# Patient Record
Sex: FEMALE | Race: WHITE | NOT HISPANIC OR LATINO | Employment: UNEMPLOYED | URBAN - METROPOLITAN AREA
[De-identification: names, ages, dates, MRNs, and addresses within clinical notes are randomized per-mention and may not be internally consistent; named-entity substitution may affect disease eponyms.]

---

## 2018-08-22 ENCOUNTER — HOSPITAL ENCOUNTER (EMERGENCY)
Facility: HOSPITAL | Age: 83
Discharge: HOME | End: 2018-08-22
Attending: FAMILY MEDICINE
Payer: MEDICARE

## 2018-08-22 VITALS
HEART RATE: 61 BPM | TEMPERATURE: 100 F | WEIGHT: 150.88 LBS | RESPIRATION RATE: 18 BRPM | DIASTOLIC BLOOD PRESSURE: 59 MMHG | HEIGHT: 63 IN | SYSTOLIC BLOOD PRESSURE: 117 MMHG | OXYGEN SATURATION: 95 % | BODY MASS INDEX: 26.73 KG/M2

## 2018-08-22 DIAGNOSIS — R33.9 URINARY RETENTION: Primary | ICD-10-CM

## 2018-08-22 LAB
ALBUMIN SERPL BCP-MCNC: 3.4 G/DL
ALP SERPL-CCNC: 61 U/L
ALT SERPL W/O P-5'-P-CCNC: 12 U/L
ANION GAP SERPL CALC-SCNC: 8 MMOL/L
AST SERPL-CCNC: 30 U/L
BASOPHILS # BLD AUTO: 0.01 K/UL
BASOPHILS NFR BLD: 0.1 %
BILIRUB SERPL-MCNC: 1.4 MG/DL
BILIRUB UR QL STRIP: NEGATIVE
BUN SERPL-MCNC: 33 MG/DL
CALCIUM SERPL-MCNC: 8.7 MG/DL
CHLORIDE SERPL-SCNC: 100 MMOL/L
CLARITY UR: CLEAR
CO2 SERPL-SCNC: 25 MMOL/L
COLOR UR: YELLOW
CREAT SERPL-MCNC: 1.8 MG/DL
DIFFERENTIAL METHOD: ABNORMAL
EOSINOPHIL # BLD AUTO: 0 K/UL
EOSINOPHIL NFR BLD: 0 %
ERYTHROCYTE [DISTWIDTH] IN BLOOD BY AUTOMATED COUNT: 13.9 %
EST. GFR  (AFRICAN AMERICAN): 29 ML/MIN/1.73 M^2
EST. GFR  (NON AFRICAN AMERICAN): 25 ML/MIN/1.73 M^2
GLUCOSE SERPL-MCNC: 159 MG/DL
GLUCOSE UR QL STRIP: NEGATIVE
HCT VFR BLD AUTO: 31 %
HGB BLD-MCNC: 10.2 G/DL
HGB UR QL STRIP: ABNORMAL
KETONES UR QL STRIP: NEGATIVE
LEUKOCYTE ESTERASE UR QL STRIP: NEGATIVE
LYMPHOCYTES # BLD AUTO: 0.8 K/UL
LYMPHOCYTES NFR BLD: 11.8 %
MCH RBC QN AUTO: 29.5 PG
MCHC RBC AUTO-ENTMCNC: 32.9 G/DL
MCV RBC AUTO: 90 FL
MONOCYTES # BLD AUTO: 0.7 K/UL
MONOCYTES NFR BLD: 9.7 %
NEUTROPHILS # BLD AUTO: 5.2 K/UL
NEUTROPHILS NFR BLD: 78.4 %
NITRITE UR QL STRIP: NEGATIVE
PH UR STRIP: 7 [PH] (ref 5–8)
PLATELET # BLD AUTO: 110 K/UL
PMV BLD AUTO: 10.7 FL
POTASSIUM SERPL-SCNC: 4.5 MMOL/L
PROT SERPL-MCNC: 6.5 G/DL
PROT UR QL STRIP: NEGATIVE
RBC # BLD AUTO: 3.46 M/UL
SODIUM SERPL-SCNC: 133 MMOL/L
SP GR UR STRIP: 1.01 (ref 1–1.03)
URN SPEC COLLECT METH UR: ABNORMAL
UROBILINOGEN UR STRIP-ACNC: NEGATIVE EU/DL
WBC # BLD AUTO: 6.67 K/UL

## 2018-08-22 PROCEDURE — 85025 COMPLETE CBC W/AUTO DIFF WBC: CPT

## 2018-08-22 PROCEDURE — 99284 EMERGENCY DEPT VISIT MOD MDM: CPT | Mod: 25

## 2018-08-22 PROCEDURE — 25500020 PHARM REV CODE 255: Performed by: FAMILY MEDICINE

## 2018-08-22 PROCEDURE — 80053 COMPREHEN METABOLIC PANEL: CPT

## 2018-08-22 PROCEDURE — 81003 URINALYSIS AUTO W/O SCOPE: CPT

## 2018-08-22 PROCEDURE — 63600175 PHARM REV CODE 636 W HCPCS: Performed by: FAMILY MEDICINE

## 2018-08-22 PROCEDURE — 96374 THER/PROPH/DIAG INJ IV PUSH: CPT

## 2018-08-22 RX ORDER — KETOROLAC TROMETHAMINE 30 MG/ML
15 INJECTION, SOLUTION INTRAMUSCULAR; INTRAVENOUS
Status: COMPLETED | OUTPATIENT
Start: 2018-08-22 | End: 2018-08-22

## 2018-08-22 RX ORDER — CIPROFLOXACIN 500 MG/1
500 TABLET ORAL 2 TIMES DAILY
COMMUNITY

## 2018-08-22 RX ORDER — PANTOPRAZOLE SODIUM 20 MG/1
20 TABLET, DELAYED RELEASE ORAL DAILY
COMMUNITY

## 2018-08-22 RX ORDER — LORAZEPAM 1 MG/1
1 TABLET ORAL EVERY 6 HOURS PRN
COMMUNITY

## 2018-08-22 RX ORDER — INSULIN ASPART 100 [IU]/ML
INJECTION, SOLUTION INTRAVENOUS; SUBCUTANEOUS
COMMUNITY

## 2018-08-22 RX ADMIN — KETOROLAC TROMETHAMINE 15 MG: 30 INJECTION, SOLUTION INTRAMUSCULAR at 11:08

## 2018-08-22 RX ADMIN — IOHEXOL 30 ML: 350 INJECTION, SOLUTION INTRAVENOUS at 08:08

## 2018-08-22 NOTE — ED NOTES
Pt resting quietly, NAD noted, VSS, respirations even/unlabored, call light in reach, bed low, SR x 2. Son at bedside who reports pt needs to be placed in hospital due to can't walk. When asked, son states she was walking yesterday but can't walk today.

## 2018-08-22 NOTE — ED NOTES
Attempted to perform med rec pt does not remember all the names of medications she is taking or has a list of the medications nor has the medications with her.

## 2018-08-22 NOTE — ED PROVIDER NOTES
SCRIBE #1 NOTE: ILaney, am scribing for, and in the presence of, Liliana Leon MD. I have scribed the entire note.      History      Chief Complaint   Patient presents with    Urinary Retention     Pt diagnosed with UTI yesterday and started with tx. Pt has no relief of symptoms.        Review of patient's allergies indicates:   Allergen Reactions    Codeine     Morphine         HPI   HPI    8/22/2018, 6:02 AM   History obtained from the patient      History of Present Illness: Renetta Motta is a 86 y.o. female patient with PMHx of UTI who presents to the Emergency Department for urinary retention which onset gradually yesterday. She called her doctor and was called in an abx but patient states that she has not had any relief. Symptoms are constant and moderate in severity. No mitigating or exacerbating factors reported. Pt states that she is experiencing lower abd pain and pressure and has vomited. She denies fever, chills, flank pain, back pain, dysuria, hematuria, and all other sxs at this time. No further complaints or concerns at this time.       Arrival mode: Personal vehicle    PCP: Samir Hargrove MD       Past Medical History:  Past Medical History:   Diagnosis Date    Acid reflux     Diabetes     Heart murmur     High cholesterol     HTN (hypertension)     UTI (urinary tract infection)        Past Surgical History:  Past Surgical History:   Procedure Laterality Date    BACK SURGERY      HYSTERECTOMY      open heart surgery      TONSILLECTOMY           Family History:  History reviewed. No pertinent family history.    Social History:  Social History     Tobacco Use    Smoking status: Never Smoker    Smokeless tobacco: Never Used   Substance and Sexual Activity    Alcohol use: No     Frequency: Never    Drug use: No    Sexual activity: Unknown       ROS   Review of Systems   Constitutional: Negative for chills and fever.   HENT: Negative for sore throat.    Respiratory:  Negative for shortness of breath.    Cardiovascular: Negative for chest pain.   Gastrointestinal: Positive for abdominal pain, nausea and vomiting. Negative for blood in stool, constipation and diarrhea.   Genitourinary: Positive for difficulty urinating. Negative for dysuria, flank pain and hematuria.   Musculoskeletal: Negative for back pain.   Skin: Negative for rash.   Neurological: Negative for weakness.   Hematological: Does not bruise/bleed easily.   All other systems reviewed and are negative.      Physical Exam      Initial Vitals [08/22/18 0532]   BP Pulse Resp Temp SpO2   (!) 113/57 70 16 99.7 °F (37.6 °C) 97 %      MAP       --          Physical Exam  Nursing Notes and Vital Signs Reviewed.  Constitutional: Patient is in no acute distress. Awake and alert. Well-developed and well-nourished.  Head: Atraumatic. Normocephalic.  Eyes: PERRL. EOM intact. Conjunctivae are not pale. No scleral icterus.  ENT: Mucous membranes are moist. Oropharynx is clear and symmetric.    Neck: Supple. Full ROM. No lymphadenopathy.  Cardiovascular: Regular rate. Regular rhythm. No murmurs, rubs, or gallops. Distal pulses are 2+ and symmetric.  Pulmonary/Chest: No respiratory distress. Clear to auscultation bilaterally. No wheezing, rales, or rhonchi.  Abdominal: Soft and non-distended.  There is suprapubic tenderness.  No rebound, guarding, or rigidity.  Good bowel sounds.    : No CVA TTP.  Musculoskeletal: Moves all extremities. No obvious deformities. No edema. No calf tenderness.  Skin: Warm and dry.  Neurological:  Alert, awake, and appropriate.  Normal speech.  No acute focal neurological deficits are appreciated.  Psychiatric: Normal affect. Good eye contact. Appropriate in content.    ED Course    Procedures  ED Vital Signs:  Vitals:    08/22/18 0532 08/22/18 0700 08/22/18 0730 08/22/18 0800   BP: (!) 113/57 (!) 143/63 (!) 133/58 (!) 127/56   Pulse: 70 (!) 59 63 60   Resp: 16 18 18 18   Temp: 99.7 °F (37.6 °C)     "  TempSrc: Oral      SpO2: 97% 97% 96% 96%   Weight: 68.4 kg (150 lb 14.4 oz)      Height: 5' 3" (1.6 m)       08/22/18 0830 08/22/18 0900 08/22/18 1000 08/22/18 1030   BP: 133/84 134/63 (!) 120/51 (!) 119/59   Pulse: 64 62 64 64   Resp: 18 18 18 18   Temp:       TempSrc:       SpO2: 98% 96% 96% 97%   Weight:       Height:        08/22/18 1100 08/22/18 1200   BP: (!) 116/57 (!) 123/55   Pulse: 64 61   Resp: 18 18   Temp:     TempSrc:     SpO2: 96% 95%   Weight:     Height:         Abnormal Lab Results:  Labs Reviewed   CBC W/ AUTO DIFFERENTIAL - Abnormal; Notable for the following components:       Result Value    RBC 3.46 (*)     Hemoglobin 10.2 (*)     Hematocrit 31.0 (*)     Platelets 110 (*)     Lymph # 0.8 (*)     Gran% 78.4 (*)     Lymph% 11.8 (*)     All other components within normal limits   COMPREHENSIVE METABOLIC PANEL - Abnormal; Notable for the following components:    Sodium 133 (*)     Glucose 159 (*)     BUN, Bld 33 (*)     Creatinine 1.8 (*)     Albumin 3.4 (*)     Total Bilirubin 1.4 (*)     eGFR if  29 (*)     eGFR if non  25 (*)     All other components within normal limits   URINALYSIS - Abnormal; Notable for the following components:    Occult Blood UA Trace (*)     All other components within normal limits        All Lab Results:  Results for orders placed or performed during the hospital encounter of 08/22/18   CBC auto differential   Result Value Ref Range    WBC 6.67 3.90 - 12.70 K/uL    RBC 3.46 (L) 4.00 - 5.40 M/uL    Hemoglobin 10.2 (L) 12.0 - 16.0 g/dL    Hematocrit 31.0 (L) 37.0 - 48.5 %    MCV 90 82 - 98 fL    MCH 29.5 27.0 - 31.0 pg    MCHC 32.9 32.0 - 36.0 g/dL    RDW 13.9 11.5 - 14.5 %    Platelets 110 (L) 150 - 350 K/uL    MPV 10.7 9.2 - 12.9 fL    Gran # (ANC) 5.2 1.8 - 7.7 K/uL    Lymph # 0.8 (L) 1.0 - 4.8 K/uL    Mono # 0.7 0.3 - 1.0 K/uL    Eos # 0.0 0.0 - 0.5 K/uL    Baso # 0.01 0.00 - 0.20 K/uL    Gran% 78.4 (H) 38.0 - 73.0 %    Lymph% 11.8 " (L) 18.0 - 48.0 %    Mono% 9.7 4.0 - 15.0 %    Eosinophil% 0.0 0.0 - 8.0 %    Basophil% 0.1 0.0 - 1.9 %    Differential Method Automated    Comprehensive metabolic panel   Result Value Ref Range    Sodium 133 (L) 136 - 145 mmol/L    Potassium 4.5 3.5 - 5.1 mmol/L    Chloride 100 95 - 110 mmol/L    CO2 25 23 - 29 mmol/L    Glucose 159 (H) 70 - 110 mg/dL    BUN, Bld 33 (H) 8 - 23 mg/dL    Creatinine 1.8 (H) 0.5 - 1.4 mg/dL    Calcium 8.7 8.7 - 10.5 mg/dL    Total Protein 6.5 6.0 - 8.4 g/dL    Albumin 3.4 (L) 3.5 - 5.2 g/dL    Total Bilirubin 1.4 (H) 0.1 - 1.0 mg/dL    Alkaline Phosphatase 61 55 - 135 U/L    AST 30 10 - 40 U/L    ALT 12 10 - 44 U/L    Anion Gap 8 8 - 16 mmol/L    eGFR if African American 29 (A) >60 mL/min/1.73 m^2    eGFR if non African American 25 (A) >60 mL/min/1.73 m^2   Urinalysis   Result Value Ref Range    Specimen UA Urine, Catheterized     Color, UA Yellow Yellow, Straw, Glo    Appearance, UA Clear Clear    pH, UA 7.0 5.0 - 8.0    Specific Gravity, UA 1.010 1.005 - 1.030    Protein, UA Negative Negative    Glucose, UA Negative Negative    Ketones, UA Negative Negative    Bilirubin (UA) Negative Negative    Occult Blood UA Trace (A) Negative    Nitrite, UA Negative Negative    Urobilinogen, UA Negative <2.0 EU/dL    Leukocytes, UA Negative Negative     Imaging Results:  Imaging Results          CT Abdomen Pelvis  Without Contrast (Final result)  Result time 08/22/18 10:58:47   Procedure changed from CT Abdomen Pelvis With Contrast     Final result by FLORECITA Jennings Sr., MD (08/22/18 10:58:47)                 Impression:      1. There is a 36 mm mass in the right adrenal that has calcifications associated with it. The hypodense portion of this mass has a Hounsfield measurement of 1.  I recommend consideration of a PET-CT examination.  This may help to determine if a biopsy is indicated.  2. The stomach is not well distended. The wall of the body and fundus of the stomach appears to be  thickened measuring 14 mm in thickness.  This may represent a gastritis.  I recommend consideration of direct visualization via endoscopy.  3. A Leong catheter is in place. There is a tiny amount of gas within the urinary bladder.  4. There is a severe compression fracture of L1. There is a moderate compression fracture of L3.  5. There are sclerotic changes in both femoral heads.  This is characteristic of osteoarthritis.  6. There is a mild amount of levoconvex curvature of the thoracolumbar spine.  All CT scans at this facility use dose modulation, iterative reconstruction, and/or weight base dosing when appropriate to reduce radiation dose when appropriate to reduce radiation dose to as low as reasonably achievable.      Electronically signed by: Guzman Jennings MD  Date:    08/22/2018  Time:    10:58             Narrative:    EXAMINATION:  CT ABDOMEN PELVIS WITHOUT CONTRAST    CLINICAL HISTORY:  Abdominal distension; urinary tract infection    TECHNIQUE:  Standard abdomen and pelvis CT protocol without IV contrast was performed.  Oral contrast was administered.    FINDINGS:  Finding: The size of the heart is within normal limits.  There are minimal dependent atelectatic changes in both lungs.  There is no pneumothorax or pleural effusion.    The liver, gallbladder, pancreas, spleen, and left adrenal are normal in appearance.  There is a 36 mm mass in the right adrenal that has calcifications associated with it.  The hypodense portion of this mass has a Hounsfield measurement of 1.  There is a mild amount of perinephric stranding bilaterally.  The ureters are normal in appearance.  A Leong catheter is in place.  There is a tiny amount of gas within the urinary bladder.  The uterus is absent.  The appendix was not visualized.  The stomach is not well distended.  The wall of the body and fundus of the stomach appears to be thickened measuring 14 mm in thickness.  There is no free fluid within the abdomen or  pelvis. There is no pneumoperitoneum.  There is a marked amount of atherosclerosis.  There is a severe compression fracture of L1.  There is a moderate compression fracture of L3.  There is a mild amount of levoconvex curvature of the thoracolumbar spine.  There are sclerotic changes in both femoral heads.                                 The Emergency Provider reviewed the vital signs and test results, which are outlined above.    ED Discussion   7:39 AM: Re-evaluated pt. After the gray catheter was placed, only a 100 cc of clear yellow urine was collected. Patient is still experiencing suprapubic pressure and tenderness. Will order CT scan.    11:09 AM: Re-evaluated patient. Patient is not feeling any better. There are no signs of infection. Patient is already on Cipro. D/w patient CT results. CT does not reveal what could be causing patient's pain. She states the L1 fxs visualized on CT are chronic. Gave pt all f/u and return to the ED instructions. All questions and concerns were addressed at this time. Pt expresses understanding of information and instructions, and is comfortable with plan to discharge. Pt is stable for discharge.    I discussed with patient and/or family/caretaker that evaluation in the ED does not suggest any emergent or life threatening medical conditions requiring immediate intervention beyond what was provided in the ED, and I believe patient is safe for discharge.  Regardless, an unremarkable evaluation in the ED does not preclude the development or presence of a serious of life threatening condition. As such, patient was instructed to return immediately for any worsening or change in current symptoms.      ED Medication(s):  Medications   omnipaque 350 iohexol 30 mL (30 mLs Oral Given 8/22/18 3991)   ketorolac injection 15 mg (15 mg Intravenous Given 8/22/18 1122)       New Prescriptions    No medications on file       Follow-up Information     Samir Hargrove MD. Schedule an  appointment as soon as possible for a visit in 1 day.    Specialty:  Family Medicine  Contact information:  [id number]  [facility] [address]  [phone]                    Medical Decision Making    Medical Decision Making:   Clinical Tests:   Lab Tests: Reviewed and Ordered  Radiological Study: Reviewed and Ordered           Scribe Attestation:   Scribe #1: I performed the above scribed service and the documentation accurately describes the services I performed. I attest to the accuracy of the note.    Attending:   Physician Attestation Statement for Scribe #1: I, Liliana Leon MD, personally performed the services described in this documentation, as scribed by Laney Alcantara, in my presence, and it is both accurate and complete.          Clinical Impression     No diagnosis found.    Disposition:   Disposition: Discharged  Condition: Stable         Liliana Leon MD  08/23/18 0600       Liliana Leon MD  08/23/18 0605